# Patient Record
Sex: FEMALE | Race: WHITE | Employment: FULL TIME | URBAN - NONMETROPOLITAN AREA
[De-identification: names, ages, dates, MRNs, and addresses within clinical notes are randomized per-mention and may not be internally consistent; named-entity substitution may affect disease eponyms.]

---

## 2017-06-17 ENCOUNTER — COMMUNICATION - GICH (OUTPATIENT)
Dept: FAMILY MEDICINE | Facility: OTHER | Age: 22
End: 2017-06-17

## 2017-06-17 DIAGNOSIS — Z30.09 ENCOUNTER FOR OTHER GENERAL COUNSELING AND ADVICE ON CONTRACEPTION: ICD-10-CM

## 2017-09-16 ENCOUNTER — COMMUNICATION - GICH (OUTPATIENT)
Dept: FAMILY MEDICINE | Facility: OTHER | Age: 22
End: 2017-09-16

## 2017-09-16 DIAGNOSIS — Z30.09 ENCOUNTER FOR OTHER GENERAL COUNSELING AND ADVICE ON CONTRACEPTION: ICD-10-CM

## 2017-12-28 NOTE — TELEPHONE ENCOUNTER
Patient Information     Patient Name MRN Zuleima Bryan [id number] Female 1995      Telephone Encounter by Mary Jones RN at 2017  1:40 PM     Author:  Mary Jones RN Service:  (none) Author Type:  NURS- Registered Nurse     Filed:  2017  1:43 PM Encounter Date:  2017 Status:  Signed     :  Mary Jones RN (NURS- Registered Nurse)            Hormones    Office visit in the past 12 months or per provider note.    Last visit with ESTEVAN MARK was on: 2016 in [facility]  Next visit with ESTEVAN MARK is on: No future appointment listed with this provider  Next visit with Family Practice is on: No future appointment listed in this department    Max refill for 12 months from last office visit or per provider note.    Patient is due for medication management appointment. Limited refill provided at this time. [employer] message and/or letter sent for reminder to patient. Prescription refilled per RN Medication Refill Policy.................... Mary Jones RN ....................  2017   1:42 PM

## 2017-12-28 NOTE — TELEPHONE ENCOUNTER
Patient Information     Patient Name MRN Zuleima Bryan [id number] Female 1995      Telephone Encounter by Chevy Wise RN at 2017  9:20 AM     Author:  Chevy Wise RN Service:  (none) Author Type:  NURS- Registered Nurse     Filed:  2017  9:23 AM Encounter Date:  2017 Status:  Signed     :  Chevy Wise RN (NURS- Registered Nurse)            Hormones    Office visit in the past 12 months or per provider note.    Last visit with ESTEVAN MARK was on: 2016 in [facility]  Next visit with ESTEVAN MARK is on: No future appointment listed with this provider  Next visit with Family Practice is on: No future appointment listed in this department    Max refill for 12 months from last office visit or per provider note. Prescription refilled per RN Medication Refill Policy.................... CHEVY WISE RN ....................  2017   9:21 AM

## 2018-03-09 ENCOUNTER — DOCUMENTATION ONLY (OUTPATIENT)
Dept: FAMILY MEDICINE | Facility: OTHER | Age: 23
End: 2018-03-09

## 2018-03-09 PROBLEM — Z91.89 PERSONAL HISTORY PRESENTING HAZARDS TO HEALTH: Status: ACTIVE | Noted: 2018-03-09

## 2018-03-09 RX ORDER — LEVONORGESTREL/ETHIN.ESTRADIOL 0.1-0.02MG
1 TABLET ORAL DAILY
COMMUNITY
Start: 2017-09-19 | End: 2019-11-08

## 2018-03-25 ENCOUNTER — HEALTH MAINTENANCE LETTER (OUTPATIENT)
Age: 23
End: 2018-03-25

## 2018-07-24 NOTE — PROGRESS NOTES
Patient Information     Patient Name  Zuleima Maynard MRN  [id number] Sex  Female   1995      Letter by Ernestina Spangler MD at      Author:  Ernestina Spangler MD Service:  (none) Author Type:  (none)    Filed:   Encounter Date:  2017 Status:  (Other)           [place]  [address] [place] [address] [address]          2017    Dear Ms. Maynard:    This letter is to remind you that you are due for your annual exam with Ernestina Spangler MD. Your last comprehensive visit was more than 12 months ago.    A LIMITED refill of         ORSYTHIA 0.1-20 mg-mcg tablet has been called into your pharmacy.     Additional refills require you to complete this appointment.    Please call the clinic at [phone] to schedule your appointment.    If you should require additional refills before your scheduled appointment, please contact your pharmacy and we will refill your medication until the date of your appointment.    If you are no longer seeing Ernestina Spangler MD for primary care, please call to let us know. Doing so will remove you from our call/contact list.      Thank you for choosing [facility] and [facility] for your health care needs.    Sincerely,    Refill RN  [facility]

## 2019-11-08 ENCOUNTER — OFFICE VISIT (OUTPATIENT)
Dept: FAMILY MEDICINE | Facility: OTHER | Age: 24
End: 2019-11-08
Attending: NURSE PRACTITIONER
Payer: COMMERCIAL

## 2019-11-08 ENCOUNTER — TELEPHONE (OUTPATIENT)
Dept: FAMILY MEDICINE | Facility: OTHER | Age: 24
End: 2019-11-08

## 2019-11-08 VITALS
DIASTOLIC BLOOD PRESSURE: 78 MMHG | SYSTOLIC BLOOD PRESSURE: 122 MMHG | WEIGHT: 163.1 LBS | RESPIRATION RATE: 16 BRPM | HEIGHT: 66 IN | TEMPERATURE: 99 F | BODY MASS INDEX: 26.21 KG/M2 | OXYGEN SATURATION: 99 % | HEART RATE: 103 BPM

## 2019-11-08 DIAGNOSIS — N76.0 VAGINITIS AND VULVOVAGINITIS: Primary | ICD-10-CM

## 2019-11-08 LAB
C TRACH DNA SPEC QL PROBE+SIG AMP: NOT DETECTED
N GONORRHOEA DNA SPEC QL PROBE+SIG AMP: NOT DETECTED
SPECIMEN SOURCE: ABNORMAL
SPECIMEN SOURCE: NORMAL
WET PREP SPEC: ABNORMAL

## 2019-11-08 PROCEDURE — 99213 OFFICE O/P EST LOW 20 MIN: CPT | Performed by: FAMILY MEDICINE

## 2019-11-08 PROCEDURE — 87491 CHLMYD TRACH DNA AMP PROBE: CPT | Mod: ZL | Performed by: FAMILY MEDICINE

## 2019-11-08 PROCEDURE — 87210 SMEAR WET MOUNT SALINE/INK: CPT | Mod: ZL | Performed by: FAMILY MEDICINE

## 2019-11-08 PROCEDURE — 87591 N.GONORRHOEAE DNA AMP PROB: CPT | Mod: ZL | Performed by: FAMILY MEDICINE

## 2019-11-08 RX ORDER — FLUCONAZOLE 150 MG/1
150 TABLET ORAL ONCE
Qty: 1 TABLET | Refills: 0 | Status: SHIPPED | OUTPATIENT
Start: 2019-11-08 | End: 2019-11-08

## 2019-11-08 ASSESSMENT — PAIN SCALES - GENERAL: PAINLEVEL: MILD PAIN (3)

## 2019-11-08 ASSESSMENT — MIFFLIN-ST. JEOR: SCORE: 1511.57

## 2019-11-08 NOTE — NURSING NOTE
Patient has not been feeling well for 2 days.  Their symptoms are possible yeast infection.   Vania Doyle LPN LPN....................  11/8/2019   10:55 AM

## 2019-11-08 NOTE — PATIENT INSTRUCTIONS
We will call you with results. We will send a prescription to your pharmacy if any of the tests return positive.    In the future, if these symptoms turn out to be yeast and if they recur, you may treat yourself with OTC Monistat or anything similar.    Avoid tight or restricting clothing. Sleep with no panties.

## 2019-11-08 NOTE — PROGRESS NOTES
"  SUBJECTIVE:   Zuleima Maynard is a 24 year old female who presents to clinic today for the following health issues: possible yeast infection    HPI  Healthy woman with a 2-day Hx of \"different\" vaginal discharge. Described as white, chunky, irritated but not itchy. Can't think of any precipitating factors.   Stopped OCs almost a year ago, now on no birth control, using condoms. Doesn't find the condoms irritating.  Mild pelvic cramping, denies actual pain. No vaginal bleeding; LMP 10/15/2019.   New relationship within the past year.     Past Medical History:   Diagnosis Date     Anxiety disorder     Anxiety NOS      Past Surgical History:   Procedure Laterality Date     EXTRACTION(S) DENTAL      No Comments Provided     Review of Systems   As per HPI    OBJECTIVE:     /78 (BP Location: Right arm, Patient Position: Sitting, Cuff Size: Adult Regular)   Pulse 103   Temp 99  F (37.2  C) (Tympanic)   Resp 16   Ht 1.676 m (5' 6\")   Wt 74 kg (163 lb 1.6 oz)   LMP 10/15/2019 (Approximate)   SpO2 99%   Breastfeeding? No   BMI 26.33 kg/m    Body mass index is 26.33 kg/m .     Physical Exam  General: Alert, healthy-appearing young woman in NAD  Heart: Regular rate and rhythm without murmur  Lungs: Clear with good air exchange  Abdomen: Soft and flat with no CVA or flank tenderness, no HSM or mass.  Mild bilateral lower quadrant and suprapubic tenderness  GYN: External genitalia normal.  Vaginal discharge is thick, white, and curd-like.  Cervix is parous and clean with no abnormal cervical discharge.  On bimanual, there is mild bilateral adnexal tenderness and tenderness over the bladder, but no cervical motion tenderness.  Extremities: Without edema    Results for orders placed or performed in visit on 11/08/19   Wet Prep, Genital     Status: Abnormal   Result Value Ref Range    Specimen Description Vagina     Wet Prep Yeast seen (A)     Wet Prep No Trichomonas seen     Wet Prep No clue cells seen  "   GC/Chlamydia by PCR     Status: None   Result Value Ref Range    Specimen Source Vagina     Neisseria gonorrhoreae PCR Not Detected NDET^Not Detected    Chlamydia Trachomatis PCR Not Detected NDET^Not Detected       ASSESSMENT/PLAN:     1. Vaginitis and vulvovaginitis  Wet prep positive for yeast.  Will treat with oral Diflucan.  Patient educated about possible precipitating factors and OTC treatment for future episodes.  Negative GC and chlamydia.  Continue condom use.    - fluconazole (DIFLUCAN) 150 MG tablet; Take 1 tablet (150 mg) by mouth once for 1 dose  Dispense: 1 tablet; Refill: 0    Followup:  If not improving or if condition worsens, follow up with your Primary Care Provider    I explained my diagnostic considerations and recommendations to patient, who voiced understanding and agreement with the treatment plan. All questions were answered. We discussed potential side effects of any prescribed or recommended therapies, as well as expectations for response to treatments.      LEONARDO Spann MD  11/8/2019 at 11:08 AM  [facility]

## 2019-11-08 NOTE — TELEPHONE ENCOUNTER
After patients name and date of birth we verified, patient was given the results and verbalized understanding.   Zhane Allen LPN on 11/8/2019 at 3:37 PM